# Patient Record
Sex: FEMALE | Race: BLACK OR AFRICAN AMERICAN | NOT HISPANIC OR LATINO | Employment: OTHER | URBAN - METROPOLITAN AREA
[De-identification: names, ages, dates, MRNs, and addresses within clinical notes are randomized per-mention and may not be internally consistent; named-entity substitution may affect disease eponyms.]

---

## 2017-03-16 ENCOUNTER — TELEPHONE (OUTPATIENT)
Dept: TRANSPLANT | Facility: CLINIC | Age: 52
End: 2017-03-16

## 2017-03-16 NOTE — TELEPHONE ENCOUNTER
----- Message from Risa Colbert sent at 3/16/2017  1:38 PM CDT -----  Contact: sister/Verna  Calling to check if she needs appts please call her @ # [phone].

## 2017-03-17 NOTE — TELEPHONE ENCOUNTER
Alix will fax a letter stating that the patient has completed 6 months of a smoking cessation program.  Once we receive the letter, I will make the transplant episode active and we will get her cleared for eval.  I will contact her once clearance is obtained.    Explained that we will need to see the patient in clinic prior to scheduling work up since it has been several months since seeing her.  Verna verbalized understanding.

## 2017-04-03 ENCOUNTER — TELEPHONE (OUTPATIENT)
Dept: TRANSPLANT | Facility: CLINIC | Age: 52
End: 2017-04-03

## 2017-04-03 NOTE — TELEPHONE ENCOUNTER
----- Message from Jackie Manzo RN sent at 11/7/2016  1:55 PM CST -----  Pt had to have 6 months of documented smoking cessation program attendance before his insurance will approve lung transplant evaluation.

## 2017-04-03 NOTE — TELEPHONE ENCOUNTER
Have left several messages for Alix as I have not received the letter we discussed regarding the patient completing 6 months of a smoking cessation program.  Alix states she needs to contact the patient to touch base prior to sending the letter and that she plans to be able to complete it tomorrow.

## 2017-04-13 ENCOUNTER — TELEPHONE (OUTPATIENT)
Dept: TRANSPLANT | Facility: CLINIC | Age: 52
End: 2017-04-13

## 2017-04-13 NOTE — TELEPHONE ENCOUNTER
Notified patient that I have not received a letter regarding her smoking cessation program completion letter form [employer] even though I have contacted Miri numerous times.  She will attempt to obtain the letter.

## 2017-05-04 ENCOUNTER — TELEPHONE (OUTPATIENT)
Dept: TRANSPLANT | Facility: CLINIC | Age: 52
End: 2017-05-04

## 2017-05-04 DIAGNOSIS — J84.9 ILD (INTERSTITIAL LUNG DISEASE): Primary | Chronic | ICD-10-CM

## 2017-05-04 DIAGNOSIS — I27.20 PULMONARY HYPERTENSION: ICD-10-CM

## 2017-05-04 DIAGNOSIS — Z76.82 LUNG TRANSPLANT CANDIDATE: ICD-10-CM

## 2017-05-04 NOTE — TELEPHONE ENCOUNTER
Called patient to see if she was able to obtain a smoking cessation completion letter.  She said she does have it and that [facility] was supposed to have sent it to me as well.  I explained that I never received it.  It was faxed to me this afternoon.  Dr. Shepherd would like to see the patient in clinic to determine candidacy for work up at this point.  Scheduling card placed on [facility]'s desk.

## 2017-05-17 ENCOUNTER — HOSPITAL ENCOUNTER (OUTPATIENT)
Dept: PULMONOLOGY | Facility: CLINIC | Age: 52
Discharge: HOME | End: 2017-05-17
Payer: MEDICAID

## 2017-05-17 ENCOUNTER — OFFICE VISIT (OUTPATIENT)
Dept: TRANSPLANT | Facility: CLINIC | Age: 52
End: 2017-05-17
Payer: MEDICAID

## 2017-05-17 VITALS
TEMPERATURE: 98 F | WEIGHT: 218 LBS | DIASTOLIC BLOOD PRESSURE: 61 MMHG | BODY MASS INDEX: 35.03 KG/M2 | RESPIRATION RATE: 20 BRPM | HEART RATE: 88 BPM | SYSTOLIC BLOOD PRESSURE: 110 MMHG | HEIGHT: 66 IN | OXYGEN SATURATION: 90 %

## 2017-05-17 VITALS — BODY MASS INDEX: 35.08 KG/M2 | HEIGHT: 66 IN | WEIGHT: 218.25 LBS

## 2017-05-17 DIAGNOSIS — Z76.82 LUNG TRANSPLANT CANDIDATE: ICD-10-CM

## 2017-05-17 DIAGNOSIS — J84.9 ILD (INTERSTITIAL LUNG DISEASE): Primary | ICD-10-CM

## 2017-05-17 DIAGNOSIS — I27.20 PULMONARY HYPERTENSION: ICD-10-CM

## 2017-05-17 DIAGNOSIS — J84.9 ILD (INTERSTITIAL LUNG DISEASE): Chronic | ICD-10-CM

## 2017-05-17 DIAGNOSIS — G47.33 OSA ON CPAP: ICD-10-CM

## 2017-05-17 DIAGNOSIS — E66.9 OBESITY (BMI 30-39.9): ICD-10-CM

## 2017-05-17 DIAGNOSIS — J96.11 CHRONIC RESPIRATORY FAILURE WITH HYPOXIA: ICD-10-CM

## 2017-05-17 LAB
PRE FEV1 FVC: 74
PRE FEV1: 1.08
PRE FVC: 1.45
PREDICTED FEV1 FVC: 81
PREDICTED FEV1: 2.8
PREDICTED FVC: 3.43

## 2017-05-17 PROCEDURE — 94010 BREATHING CAPACITY TEST: CPT | Mod: 26,S$PBB,, | Performed by: INTERNAL MEDICINE

## 2017-05-17 PROCEDURE — 99213 OFFICE O/P EST LOW 20 MIN: CPT | Mod: PBBFAC | Performed by: INTERNAL MEDICINE

## 2017-05-17 PROCEDURE — 94729 DIFFUSING CAPACITY: CPT | Mod: 26,S$PBB,, | Performed by: INTERNAL MEDICINE

## 2017-05-17 PROCEDURE — 99999 PR PBB SHADOW E&M-EST. PATIENT-LVL III: CPT | Mod: PBBFAC,,, | Performed by: INTERNAL MEDICINE

## 2017-05-17 PROCEDURE — 94620 PR PULMONARY STRESS TESTING,SIMPLE: CPT | Mod: 26,S$PBB,, | Performed by: INTERNAL MEDICINE

## 2017-05-17 PROCEDURE — 99214 OFFICE O/P EST MOD 30 MIN: CPT | Mod: 25,S$PBB,, | Performed by: INTERNAL MEDICINE

## 2017-05-17 NOTE — LETTER
May 18, 2017        Jason Hayes  [address] [place] [address]  Phone: [phone]  Fax: [phone]             [facility]  [address]  [facility] [address]  Phone: [phone]   Patient: Bronwyn Mills   MR Number: [id number]   YOB: 1965   Date of Visit: 5/17/2017       Dear Dr. Jason Hayes    Thank you for referring Bronwyn Mills to me for evaluation. Attached you will find relevant portions of my assessment and plan of care.    If you have questions, please do not hesitate to call me. I look forward to following Bronwyn Mills along with you.    Sincerely,    Walt Shepherd MD    Enclosure    If you would like to receive this communication electronically, please contact [email or web address] or [phone] to request [employer] Link access.    [employer] Link is a tool which provides read-only access to select patient information with whom you have a relationship. Its easy to use and provides real time access to review your patients record including encounter summaries, notes, results, and demographic information.    If you feel you have received this communication in error or would no longer like to receive these types of communications, please e-mail [email or web address]

## 2017-05-17 NOTE — PROGRESS NOTES
"LUNG TRANSPLANT PRE FOLLOW-UP    Referring Physician: Jason Hayes    Reason for Visit:  Pre-lung transplant follow-up.         Date of Initial Evaluation: September 22, 2016 (admitted due to ILD exacerbation after completing PFTs)                                                                                           History of Present Illness: Bronwyn Mills is a 51 y.o. female who is on 4L of oxygen. She is on CPAP for ANGELA. Her New York Heart Association Class is III and a Karnofsky score of 70. Cares for self; unable to carry on normal activity or active work. She is not diabetic. She has a history of interstitial lung disease of unclear etiology previously believed to be possibly related to either connective tissue disease or sarcoidosis. Autoimmune workup negative. Aldolase is mildly elevated. CRP is elevated, however CPK is negative. Inflammatory myopathy not yet fully excluded.     Since last seen in this clinic she was admitted to the hospital twice with pneumonia. Two or three months ago, she also developed bacteremia due to a soft tissue infection of her arm, and completed a course of home IV antibiotics. She states that she is feeling better, but still has dyspnea and weakness. She is short of breath performing any ADLs. She denies cough. She admits to gaining weight because she has been eating out with her grandchildren. She has been unable to participate in pulmonary rehabilitation because her medicare will not pay for it. She did complete her 6 months of smoking cessation classes.     /61 (BP Location: Left arm, Patient Position: Sitting, BP Method: Automatic)  Pulse 88  Temp 98.4 °F (36.9 °C) (Oral)   Resp 20  Ht 5' 6" (1.676 m)  Wt 98.9 kg (218 lb)  SpO2 (!) 90% Comment: 4L NC  BMI 35.19 kg/m2  Review of Systems   Constitutional: Positive for malaise/fatigue. Negative for chills, fever and weight loss (gaining weight).   HENT: Negative for congestion and sore throat.  " "  Eyes: Positive for blurred vision (needs a new pair of glasses). Negative for double vision, photophobia, pain, discharge and redness.   Respiratory: Positive for shortness of breath. Negative for cough, hemoptysis, sputum production and wheezing.    Cardiovascular: Negative for chest pain, palpitations and leg swelling.   Gastrointestinal: Negative for abdominal pain, blood in stool, constipation, diarrhea, heartburn, nausea and vomiting.   Genitourinary: Negative for dysuria, frequency, hematuria and urgency.   Musculoskeletal: Negative for back pain, joint pain, myalgias (typically has leg cramps but denies today) and neck pain.   Skin: Negative for rash.   Neurological: Positive for weakness. Negative for dizziness, sensory change, focal weakness, seizures, loss of consciousness and headaches.   Psychiatric/Behavioral: Negative for depression and suicidal ideas. The patient is not nervous/anxious and does not have insomnia.      Objective:   /61 (BP Location: Left arm, Patient Position: Sitting, BP Method: Automatic)  Pulse 88  Temp 98.4 °F (36.9 °C) (Oral)   Resp 20  Ht 5' 6" (1.676 m)  Wt 98.9 kg (218 lb)  SpO2 (!) 90% Comment: 4L NC  BMI 35.19 kg/m2    Physical Exam   Constitutional: She is oriented to person, place, and time and well-developed, well-nourished, and in no distress.   Obese woman, sitting in wheelchair.   HENT:   Head: Normocephalic and atraumatic.   Mouth/Throat: Oropharynx is clear and moist.   Eyes: Conjunctivae and EOM are normal. Right eye exhibits no discharge. Left eye exhibits no discharge. No scleral icterus.   Neck: Normal range of motion. Neck supple.   Cardiovascular: Normal rate, regular rhythm, normal heart sounds and intact distal pulses.    No murmur heard.  Pulmonary/Chest: Effort normal and breath sounds normal. No stridor. No respiratory distress. She has no wheezes. She has no rales.   Abdominal: Soft. Bowel sounds are normal. She exhibits no distension. There " is no tenderness.   Musculoskeletal: Normal range of motion. She exhibits no edema, tenderness or deformity.   Neurological: She is alert and oriented to person, place, and time. No cranial nerve deficit.   Skin: Skin is warm and dry. No rash noted. She is not diaphoretic. No erythema.   Psychiatric: Mood, memory, affect and judgment normal.     Labs:  Lab Visit on 05/17/2017   Component Date Value    Alcohol, Urine 05/17/2017 <10     Benzodiazepines 05/17/2017 Negative     Methadone metabolites 05/17/2017 Negative     Cocaine (Metab.) 05/17/2017 Negative     Opiate Scrn, Ur 05/17/2017 Negative     Barbiturate Screen, Ur 05/17/2017 Negative     Amphetamine Screen, Ur 05/17/2017 Negative     THC 05/17/2017 Negative     Phencyclidine 05/17/2017 Negative     Creatinine, Random Ur 05/17/2017 241.0     Toxicology Information 05/17/2017 SEE COMMENT        Pulmonary Function Tests 5/17/2017 11/2/2016 9/22/2016   FVC 1.45 1.06 1.08   FEV1 1.08 0.8 0.81   DLCO (ml/mmHg sec) 6.7 - -   FVC% 42 31 31   FEV1% 39 29 29   FEF 25-75 0.81 0.63 0.59   FEF 25-75% 27 21 20   DLCO% 33 - -     Other:     6MWT (5/17/17):  Distance 244 m (800 ft) no stops  Pre --> Post --> Recovery  SpO2 96% --> 73% --> 94% on RA  HR 90 --> 107 --> 98  /67 --> 139/72  Alesha 0 --> 5-6      Assessment:-  1. ILD (interstitial lung disease)    2. Chronic respiratory failure with hypoxia    3. Obesity (BMI 30-39.9)    4. ANGELA on CPAP    5. Lung transplant candidate      Plan:     1. FVC improved 36% over last 6 months. Etiology of her underlying lung disease remains unclear. ZA and RF negative, SSA & SSB are also negative. Aldolase was elevated at 8.4. CPK was within normal limits (44). CRP was elevated at 13.2. TSH was within normal limits. Definitive diagnosis may ultimately require muscle biopsy and Rheumatology referral.     2. States that her Medicare will not pay for pulmonary rehabilitation. Continue supplemental oxygen.     3. Counseled  regarding healthy diet and weight loss. She will need to lose ~30 lbs to reach a goal BMI of 34.     4. Encouraged adherence to nocturnal CPAP.     5. Ms. Mills meets disease some specific criteria for lung transplant (hospitalizations, desaturation to 73% on 6MWT with distance < 250 m), however she will need to lose ~30 lbs for a goal BMI of 34. She is motivated to do so.    RTC 3 mos    [facility]    Attending Note:    I have seen and evaluated the patient with the fellow. Their note reflects the content of our discussion and my plan of care.      Walt Shepherd MD  Pulmonary/Critical Care Medicine

## 2017-05-18 NOTE — PROCEDURES
Bronwyn Mills is a 51 y.o.  female patient, who presents for a 6 minute walk test ordered by Ifeanyi Shepherd MD.  The diagnosis is Pre Lung Transplant Evaluation; Interstitial Lung Disease.  The patient's BMI is 35.3 kg/m2.  Predicted distance (lower limit of normal) is 372.06 meters.      Test Results:    The test was completed without stopping.  The total time walked was 360 seconds.  During walking, the patient reported:  Dyspnea. The patient used supplemental oxygen during testing.     05/17/2017---------Distance: 243.84 meters (800 feet)     O2 Sat % Supplemental Oxygen Heart Rate Blood Pressure Alesha Scale   Pre-exercise  (Resting) 96 % 4 L/M 90 bpm 116/67 mmHg 0   During Exercise 73 % 4 L/M 107 bpm 139/72 mmHg 5-6   Post-exercise  (Recovery) 94 % 4 L/M  98 bpm       Recovery Time: 112 seconds    Performing nurse/tech: NEDA Nava      PREVIOUS STUDY:   11/02/2016---------Distance: 304.8 meters (1000 feet)       O2 Sat % Supplemental Oxygen Heart Rate Blood Pressure Alesha Scale   Pre-exercise  (Resting) 76 % 2 L/M 98 bpm 118/71 mmHg 3   During Exercise 52 % 2 L/M 118 bpm 138/80 mmHg 9   Post-exercise  (Recovery) 73 % 2 L/M  98 bpm   mmHg         CLINICAL INTERPRETATION:  Six minute walk distance is 243.84 meters (800 feet) with heavy dyspnea.  During exercise, there was significant desaturation while breathing supplemental oxygen.  Both blood pressure and heart rate remained stable with walking.  The patient did not report non-pulmonary symptoms during exercise.  Significant exercise impairment is likely due to desaturation and subjective symptoms.  Since the previous study in November 2016, exercise capacity is significantly worse.  Based upon age and body mass index, exercise capacity is less than predicted.

## 2017-06-12 DIAGNOSIS — J84.9 ILD (INTERSTITIAL LUNG DISEASE): Primary | Chronic | ICD-10-CM

## 2017-06-12 DIAGNOSIS — Z76.82 LUNG TRANSPLANT CANDIDATE: ICD-10-CM

## 2017-07-21 DIAGNOSIS — Z76.82 LUNG TRANSPLANT CANDIDATE: ICD-10-CM

## 2017-07-21 DIAGNOSIS — J84.9 ILD (INTERSTITIAL LUNG DISEASE): Primary | Chronic | ICD-10-CM

## 2017-07-21 DIAGNOSIS — J84.10 PULMONARY FIBROSIS: ICD-10-CM

## 2017-08-15 ENCOUNTER — TELEPHONE (OUTPATIENT)
Dept: TRANSPLANT | Facility: CLINIC | Age: 52
End: 2017-08-15

## 2017-08-15 NOTE — TELEPHONE ENCOUNTER
Notified patient that I would have to reschedule her appointment due to [place] medicaid not covering the spirometry with tomorrow's appointment.  Forwarded to Trish Espinosa.  Once cleared, will reschedule.

## 2017-08-16 ENCOUNTER — TELEPHONE (OUTPATIENT)
Dept: TRANSPLANT | Facility: CLINIC | Age: 52
End: 2017-08-16

## 2017-08-16 NOTE — TELEPHONE ENCOUNTER
Bronwyn's sister, Verna, called to see if we were able to find out anything regarding the patient's medicaid.  I explained that Trish Espinosa, , would be calling her.  I explained that since the patient's address is listed as [place], she no longer qualifies for [place] Medicaid.  Verna states that since the patient has been sick, she has moved in with her in [place].  I told her that we can't schedule her for any appointments until we get the discrepancy in Medicaid coverage fixed and that Trish would be in touch with specifics.  She verbalized understanding.

## 2017-08-30 ENCOUNTER — TELEPHONE (OUTPATIENT)
Dept: TRANSPLANT | Facility: CLINIC | Age: 52
End: 2017-08-30

## 2017-08-30 NOTE — TELEPHONE ENCOUNTER
Pt called to let us know that her [place] medicaid policy is active.  Trish Espinosa able to verify medicaid benefits.  Ok to reschedule.  Kevin will reschedule.

## 2017-09-29 ENCOUNTER — HOSPITAL ENCOUNTER (OUTPATIENT)
Dept: PULMONOLOGY | Facility: CLINIC | Age: 52
Discharge: HOME | End: 2017-09-29
Payer: MEDICAID

## 2017-09-29 ENCOUNTER — OFFICE VISIT (OUTPATIENT)
Dept: TRANSPLANT | Facility: CLINIC | Age: 52
End: 2017-09-29
Payer: MEDICAID

## 2017-09-29 VITALS
BODY MASS INDEX: 34.55 KG/M2 | WEIGHT: 215 LBS | OXYGEN SATURATION: 92 % | RESPIRATION RATE: 20 BRPM | HEIGHT: 66 IN | DIASTOLIC BLOOD PRESSURE: 88 MMHG | TEMPERATURE: 98 F | HEART RATE: 104 BPM | SYSTOLIC BLOOD PRESSURE: 142 MMHG

## 2017-09-29 VITALS — HEIGHT: 66 IN | BODY MASS INDEX: 34.65 KG/M2 | WEIGHT: 215.63 LBS

## 2017-09-29 DIAGNOSIS — J84.10 PULMONARY FIBROSIS: ICD-10-CM

## 2017-09-29 DIAGNOSIS — Z76.82 LUNG TRANSPLANT CANDIDATE: ICD-10-CM

## 2017-09-29 DIAGNOSIS — J84.9 ILD (INTERSTITIAL LUNG DISEASE): Primary | ICD-10-CM

## 2017-09-29 DIAGNOSIS — J84.9 ILD (INTERSTITIAL LUNG DISEASE): Chronic | ICD-10-CM

## 2017-09-29 DIAGNOSIS — J96.11 CHRONIC RESPIRATORY FAILURE WITH HYPOXIA: ICD-10-CM

## 2017-09-29 DIAGNOSIS — E66.9 OBESITY (BMI 30-39.9): ICD-10-CM

## 2017-09-29 LAB
PRE FEV1 FVC: 77
PRE FEV1: 1.15
PRE FVC: 1.5
PREDICTED FEV1 FVC: 81
PREDICTED FEV1: 2.78
PREDICTED FVC: 3.41

## 2017-09-29 PROCEDURE — 99999 PR PBB SHADOW E&M-EST. PATIENT-LVL III: CPT | Mod: PBBFAC,TXP,, | Performed by: INTERNAL MEDICINE

## 2017-09-29 PROCEDURE — 94729 DIFFUSING CAPACITY: CPT | Mod: 26,S$PBB,TXP, | Performed by: INTERNAL MEDICINE

## 2017-09-29 PROCEDURE — 99213 OFFICE O/P EST LOW 20 MIN: CPT | Mod: PBBFAC,25,TXP | Performed by: INTERNAL MEDICINE

## 2017-09-29 PROCEDURE — 94729 DIFFUSING CAPACITY: CPT | Mod: PBBFAC,TXP | Performed by: INTERNAL MEDICINE

## 2017-09-29 PROCEDURE — 94010 BREATHING CAPACITY TEST: CPT | Mod: 26,S$PBB,TXP, | Performed by: INTERNAL MEDICINE

## 2017-09-29 PROCEDURE — 94620 PR PULMONARY STRESS TESTING,SIMPLE: CPT | Mod: 26,S$PBB,TXP, | Performed by: INTERNAL MEDICINE

## 2017-09-29 PROCEDURE — 3008F BODY MASS INDEX DOCD: CPT | Mod: NTX,,, | Performed by: INTERNAL MEDICINE

## 2017-09-29 PROCEDURE — 94010 BREATHING CAPACITY TEST: CPT | Mod: PBBFAC,NTX,59 | Performed by: INTERNAL MEDICINE

## 2017-09-29 PROCEDURE — 99213 OFFICE O/P EST LOW 20 MIN: CPT | Mod: 25,S$PBB,NTX, | Performed by: INTERNAL MEDICINE

## 2017-09-29 PROCEDURE — 94620 PR PULMONARY STRESS TESTING,SIMPLE: CPT | Mod: PBBFAC,TXP | Performed by: INTERNAL MEDICINE

## 2017-09-29 NOTE — LETTER
October 1, 2017        Jason Hayes  [address] [place] [address]  Phone: [phone]  Fax: [phone]             [facility]  [address]  [facility] [address]  Phone: [phone]   Patient: Bronwyn Mills   MR Number: [id number]   YOB: 1965   Date of Visit: 9/29/2017       Dear Dr. Jason Hayes    Thank you for referring Bronwyn Mills to me for evaluation. Attached you will find relevant portions of my assessment and plan of care.    If you have questions, please do not hesitate to call me. I look forward to following Bronwyn Mills along with you.    Sincerely,    Walt Shepherd MD    Enclosure    If you would like to receive this communication electronically, please contact [email or web address] or [phone] to request [employer] Link access.    [employer] Link is a tool which provides read-only access to select patient information with whom you have a relationship. Its easy to use and provides real time access to review your patients record including encounter summaries, notes, results, and demographic information.    If you feel you have received this communication in error or would no longer like to receive these types of communications, please e-mail [email or web address]

## 2017-09-30 NOTE — PROCEDURES
Bronwyn Mills is a 52 y.o.  female patient, who presents for a 6 minute walk test ordered by Ifeanyi Shepherd MD.  The diagnosis is Pre Lung Transplant Evaluation; Interstitial Lung Disease.  The patient's BMI is 34.9 kg/m2.  Predicted distance (lower limit of normal) is 368.72 meters.      Test Results:    The test was completed without stopping.  The total time walked was 360 seconds.  During walking, the patient reported:  Chest pain, Dyspnea.  The patient used supplemental oxygen during testing.     09/29/2017---------Distance: 396.24 meters (1300 feet)     O2 Sat % Supplemental Oxygen Heart Rate Blood Pressure Alesha Scale   Pre-exercise  (Resting) 94 % 6 L/M 107 bpm 147/90 mmHg 10   During Exercise 70 % 6 L/M 139 bpm 187/97 mmHg 10   Post-exercise  (Recovery) 98 % 6 L/M  113 bpm       Recovery Time:  186 seconds    Performing nurse/tech:  RAFAELA Robert      PREVIOUS STUDY:   05/17/2017---------Distance: 243.84 meters (800 feet)       O2 Sat % Supplemental Oxygen Heart Rate Blood Pressure Alesha Scale   Pre-exercise  (Resting) 96 % 4 L/M 90 bpm 116/67 mmHg 0   During Exercise 73 % 4 L/M 107 bpm 139/72 mmHg 5-6   Post-exercise  (Recovery) 94 % 4 L/M  98 bpm           CLINICAL INTERPRETATION:  Six minute walk distance is 396.24 meters (1300 feet) with maximal severe dyspnea.  During exercise, there was significant desaturation while breathing supplemental oxygen.  Both blood pressure and heart rate increased significantly with walking.  Hypertension and Tachycardia were present prior to exercise.  The patient reported non-pulmonary symptoms during exercise.  Since the previous study in May 2017, exercise capacity is significantly improved.  Based upon age and body mass index, exercise capacity is normal.

## 2017-10-01 NOTE — PROGRESS NOTES
LUNG TRANSPLANT PRE FOLLOW-UP    Referring Physician: Jason Hayes    Reason for Visit:  Pre-lung transplant follow-up.         Date of Initial Evaluation:                                                                                              History of Present Illness: Bronwyn Mills is a 52 y.o. female who is on 4L of oxygen. She is on CPAP.  Her New York Heart Association Class is III and a Karnofsky score of 60% - Requires occasional assistance but is able to care for needs. She is not diabetic. She presents today for her follow up on lung transplant candidacy. During her last clinic visit he was asked to lose 15 lbs before we would consider working her up for transplant. She has been unable to lose the weight. She has had no hospitalizations since her last clinic visit. Still short of breath and with a dry cough.     Review of Systems   Constitutional: Positive for malaise/fatigue. Negative for chills, fever and weight loss (gaining weight).   HENT: Negative for congestion and sore throat.    Eyes: Positive for blurred vision (needs a new pair of glasses). Negative for double vision, photophobia, pain, discharge and redness.   Respiratory: Positive for shortness of breath. Negative for cough, hemoptysis, sputum production and wheezing.    Cardiovascular: Negative for chest pain, palpitations and leg swelling.   Gastrointestinal: Negative for abdominal pain, blood in stool, constipation, diarrhea, heartburn, nausea and vomiting.   Genitourinary: Negative for dysuria, frequency, hematuria and urgency.   Musculoskeletal: Negative for back pain, joint pain, myalgias (typically has leg cramps but denies today) and neck pain.   Skin: Negative for rash.   Neurological: Positive for weakness. Negative for dizziness, sensory change, focal weakness, seizures, loss of consciousness and headaches.   Psychiatric/Behavioral: Negative for depression and suicidal ideas. The patient is not nervous/anxious and  "does not have insomnia.      Objective:   BP (!) 142/88 (BP Location: Left arm, Patient Position: Sitting, BP Method: Large (Automatic))   Pulse 104   Temp 97.7 °F (36.5 °C) (Oral)   Resp 20   Ht 5' 6" (1.676 m)   Wt 97.5 kg (215 lb)   SpO2 (!) 92% Comment: 4L NC  BMI 34.70 kg/m²     Physical Exam   Constitutional: She is oriented to person, place, and time and well-developed, well-nourished, and in no distress.   Obese woman, sitting in wheelchair.   HENT:   Head: Normocephalic and atraumatic.   Mouth/Throat: Oropharynx is clear and moist.   Eyes: Conjunctivae and EOM are normal. Right eye exhibits no discharge. Left eye exhibits no discharge. No scleral icterus.   Neck: Normal range of motion. Neck supple.   Cardiovascular: Normal rate, regular rhythm, normal heart sounds and intact distal pulses.    No murmur heard.  Pulmonary/Chest: Effort normal. No stridor. No respiratory distress. She has no wheezes. She has rales in the right middle field, the right lower field, the left middle field and the left lower field.   Abdominal: Soft. Bowel sounds are normal. She exhibits no distension. There is no tenderness.   Musculoskeletal: Normal range of motion. She exhibits no edema, tenderness or deformity.   Neurological: She is alert and oriented to person, place, and time. No cranial nerve deficit.   Skin: Skin is warm and dry. No rash noted. She is not diaphoretic. No erythema.   Psychiatric: Mood, memory, affect and judgment normal.     Labs:  No visits with results within 7 Day(s) from this visit.   Latest known visit with results is:   Lab Visit on 05/17/2017   Component Date Value    Nicotine Urine 05/20/2017 <2.0     Cotinine Urine 05/20/2017 <5.0     Nornicotine Urine 05/20/2017 <2.0     Anabasine Urine 05/20/2017 <2.0     Alcohol, Urine 05/17/2017 <10     Benzodiazepines 05/17/2017 Negative     Methadone metabolites 05/17/2017 Negative     Cocaine (Metab.) 05/17/2017 Negative     Opiate Scrn, Ur " 05/17/2017 Negative     Barbiturate Screen, Ur 05/17/2017 Negative     Amphetamine Screen, Ur 05/17/2017 Negative     THC 05/17/2017 Negative     Phencyclidine 05/17/2017 Negative     Creatinine, Random Ur 05/17/2017 241.0     Toxicology Information 05/17/2017 SEE COMMENT        Pulmonary Function Tests 9/29/2017 5/17/2017 11/2/2016 9/22/2016   FVC 1.5 1.45 1.06 1.08   FEV1 1.15 1.08 0.8 0.81   DLCO (ml/mmHg sec) 7.5 6.7 - -   FVC% 44 42 31 31   FEV1% 41 39 29 29   FEF 25-75 0.94 0.81 0.63 0.59   FEF 25-75% 32 27 21 20   DLCO% 36 33 - -     6MWT: Able to walk 1300 feet ( 396 m) with lowest SpO2 70% at 6L. Very, very heavy dyspnea reported. Patient did not stop during the test. Patient was able to complete the test.       Assessment:-  1. ILD (interstitial lung disease)    2. Chronic respiratory failure with hypoxia    3. Obesity (BMI 30-39.9)    4. Lung transplant candidate      Plan:   1. Will continue to follow her FVC which has remained stable and she has not had exacerbations of her disease.     2. She will continue oxygen supplementation and CPAP.    3. Referral for Bariatric Medicine produced to help her with weight loss.    4. I explained to her that she will not be worked up for lung transplant until she loses a minimum of 15 lbs.     5. RTC in three months.

## 2017-10-03 ENCOUNTER — TELEPHONE (OUTPATIENT)
Dept: TRANSPLANT | Facility: CLINIC | Age: 52
End: 2017-10-03

## 2017-10-03 DIAGNOSIS — E66.9 OBESITY (BMI 35.0-39.9 WITHOUT COMORBIDITY): Primary | ICD-10-CM

## 2017-10-03 DIAGNOSIS — Z76.82 LUNG TRANSPLANT CANDIDATE: ICD-10-CM

## 2017-10-03 NOTE — TELEPHONE ENCOUNTER
Notified patient that we were unable to schedule her with Bariatric medicine as they are no longer accepting Medicaid.  Explained that we would schedule her with our RD to discuss methods for weight loss.  She verbalized understanding.

## 2017-10-11 ENCOUNTER — NUTRITION (OUTPATIENT)
Dept: TRANSPLANT | Facility: CLINIC | Age: 52
End: 2017-10-11
Payer: MEDICAID

## 2017-10-11 VITALS — WEIGHT: 217.13 LBS | BODY MASS INDEX: 36.18 KG/M2 | HEIGHT: 65 IN

## 2017-10-11 DIAGNOSIS — J84.9 ILD (INTERSTITIAL LUNG DISEASE): ICD-10-CM

## 2017-10-11 DIAGNOSIS — J84.10 PULMONARY FIBROSIS: ICD-10-CM

## 2017-10-11 DIAGNOSIS — E66.9 OBESITY (BMI 35.0-39.9 WITHOUT COMORBIDITY): Primary | ICD-10-CM

## 2017-10-11 DIAGNOSIS — Z01.818 ENCOUNTER FOR PRE-TRANSPLANT EVALUATION FOR LUNG TRANSPLANT: ICD-10-CM

## 2017-10-11 PROCEDURE — 97802 MEDICAL NUTRITION INDIV IN: CPT | Mod: PBBFAC,NTX | Performed by: DIETITIAN, REGISTERED

## 2017-10-11 PROCEDURE — 99211 OFF/OP EST MAY X REQ PHY/QHP: CPT | Mod: PBBFAC | Performed by: DIETITIAN, REGISTERED

## 2017-10-11 PROCEDURE — 99999 PR PBB SHADOW E&M-EST. PATIENT-LVL I: CPT | Mod: PBBFAC,TXP,, | Performed by: DIETITIAN, REGISTERED

## 2017-10-11 NOTE — PROGRESS NOTES
"TRANSPLANT NUTRITIONAL ASSESSMENT    Referring Provider: Walt Shepherd MD    Reason for Visit: Pre-lung transplant work-up    Age: 52 y.o.  Sex: female    Patient Active Problem List   Diagnosis    Lung transplant candidate    Pulmonary fibrosis    Hypoxia    ILD (interstitial lung disease)    ANGELA on CPAP    Obesity (BMI 30-39.9)    Pulmonary hypertension    Respiratory failure with hypoxia and hypercapnia     Past Medical History:   Diagnosis Date    COPD (chronic obstructive pulmonary disease)     Hyperlipidemia     ILD (interstitial lung disease)     Obesity (BMI 30-39.9)     ANGELA on CPAP     Pulmonary hypertension     Seasonal allergies      Lab Results   Component Value Date     (H) 09/23/2016    K 3.8 09/23/2016    ALBUMIN 3.4 (L) 09/23/2016    HGBA1C 5.5 09/22/2016    CALCIUM 9.7 09/23/2016     Other Pertinent Labs: none    Current Outpatient Prescriptions   Medication Sig    albuterol (PROAIR HFA) 90 mcg/actuation inhaler Inhale 2 puffs into the lungs every 6 (six) hours as needed for Shortness of Breath.    albuterol (PROVENTIL) 2.5 mg /3 mL (0.083 %) nebulizer solution Take 2.5 mg by nebulization every 6 (six) hours as needed for Wheezing.    amlodipine (NORVASC) 10 MG tablet Take 10 mg by mouth once daily.    fish oil-omega-3 fatty acids 300-1,000 mg capsule Take 1 g by mouth once daily.    lisinopril (PRINIVIL,ZESTRIL) 40 MG tablet Take 40 mg by mouth once daily.    lorazepam (ATIVAN) 1 MG tablet Take 1 mg by mouth 3 (three) times daily as needed.     omeprazole (PRILOSEC) 40 MG capsule Take 40 mg by mouth every morning.    tiotropium (SPIRIVA) 18 mcg inhalation capsule Inhale 18 mcg into the lungs once daily.    vitamin E 400 UNIT capsule Take 400 Units by mouth once daily.     No current facility-administered medications for this visit.      Allergies: Pcn [penicillins]    Ht Readings from Last 1 Encounters:   10/11/17 5' 5" (1.651 m)     Wt Readings from Last 1 " Encounters:   10/11/17 98.5 kg (217 lb 2.5 oz)      BMI: Body mass index is 36.14 kg/m².    Usual Weight: 215-220 lb  Weight Change/Time: states she was trying to loose weight for a couple of months, did not see results and went back to previous eating & sedentary habits  Current Diet: regular  Appetite/Current Intake: good   Exercise/Physical Activity: functional in ADL's, 'active' with grandchild, taking care of   Nutritional/Herbal Supplements: fish oil  Chewing/Swallowing Problems: none  Symptoms: none    Estimated Kcal Need: 2123-4151 kcal (17-20 kcal/kg)  Estimated Protein Need: 78 gm (0.8 gm/kg)    Nutritional History:   Pt present with sister today for evaluation. Pt reports attempt to loose weight after visit to clinic a few months ago, she states she did not see any significant change and went back to her former eating habits. She felt like her changes weren't working and she did not have a direct plan. Pt reports the following diet recall:  B: nothing  L: fast food, either fried chicken w/ mashed potatoes or other sides OR hamburger, fries, soda or chicken sandwich/fried fish sandwich  D: fast food again or home cooked by self or sister; red beans & rice w/ sausage, corn bread OR baked or fried chicken, potatoes OR chicken & dumplings, likes broccoli w/ cheese or ranch dressing, carrots raw or cooked, salad w/ Ranch dressing  Snacks: chips, fruit, yogurt if available, toast w/ boiled egg, pb&j  Beverages: flavored water (unsure whether this is sweetened or sugar free), soda, iced tea (has tried unsweetened and can drink this)     Nutritional Diagnoses  Problem: food- and nutrition-related knowledge deficit  Etiology: r/t no prior edu on weight loss  Symptoms: aeb diet recall, pt questions    Educational Need? yes  Barriers: none identified  Discussed with: patient and sister  Interventions: Patient taught nutrition information regarding Pre-lung transplant work-up and Weight loss education.    Provided  weight loss tips, weight management cooking tips, 1800 calorie sample day plan.  Discussed goal weight 200 lb by 6 week from today, will call patient to follow about. Then goal weight 190 lb to be maintained.  Encouraged using My Fitness Pal tomasz to log and track food intake. Pt family supportive with showing pt how to use tomasz.  Encouraged activity daily, walking, mobility.  Goals/Recommendations: gradual weight loss and limit intake of concentrated sweets  Actions Taken: instruct/provide written information and provide resources for weight management  Patient and/or family comprehend instructions: yes  Outcome: Verbalizes understanding  Monitoring: Follow up in clinic/phone as needed. RD contact info provided.    Counseling Time: 45 minutes

## 2017-10-13 ENCOUNTER — TELEPHONE (OUTPATIENT)
Dept: TRANSPLANT | Facility: CLINIC | Age: 52
End: 2017-10-13

## 2017-10-13 NOTE — TELEPHONE ENCOUNTER
Pt called stating that since she switched to [place] Medicaid, [place] Medicaid will no longer pay for her oxygen.  She states they will pick it up in a week or 2.  She asked if Dr. Shepherd would write a prescription for oxygen.  Explained that he only prescribes oxygen for patients who are listed for transplant.  Told patient to find a PCP who can see her prior to her O2 getting picked up, and if she can't find one, she should go to the ED or Urgent Care.  Pt verbalized understanding.

## 2017-11-24 DIAGNOSIS — Z76.82 LUNG TRANSPLANT CANDIDATE: ICD-10-CM

## 2017-11-24 DIAGNOSIS — J84.9 ILD (INTERSTITIAL LUNG DISEASE): Primary | Chronic | ICD-10-CM

## 2017-12-27 ENCOUNTER — HOSPITAL ENCOUNTER (OUTPATIENT)
Dept: PULMONOLOGY | Facility: CLINIC | Age: 52
Discharge: HOME | End: 2017-12-27
Payer: MEDICAID

## 2017-12-27 ENCOUNTER — OFFICE VISIT (OUTPATIENT)
Dept: TRANSPLANT | Facility: CLINIC | Age: 52
End: 2017-12-27
Payer: MEDICAID

## 2017-12-27 VITALS
SYSTOLIC BLOOD PRESSURE: 142 MMHG | TEMPERATURE: 96 F | BODY MASS INDEX: 36.99 KG/M2 | HEIGHT: 65 IN | RESPIRATION RATE: 20 BRPM | WEIGHT: 222 LBS | DIASTOLIC BLOOD PRESSURE: 86 MMHG | HEART RATE: 97 BPM | OXYGEN SATURATION: 95 %

## 2017-12-27 VITALS — HEIGHT: 66 IN | WEIGHT: 213 LBS | BODY MASS INDEX: 34.23 KG/M2

## 2017-12-27 DIAGNOSIS — J84.9 ILD (INTERSTITIAL LUNG DISEASE): Chronic | ICD-10-CM

## 2017-12-27 DIAGNOSIS — Z76.82 LUNG TRANSPLANT CANDIDATE: ICD-10-CM

## 2017-12-27 DIAGNOSIS — G47.33 OSA ON CPAP: ICD-10-CM

## 2017-12-27 DIAGNOSIS — J96.11 CHRONIC RESPIRATORY FAILURE WITH HYPOXIA: ICD-10-CM

## 2017-12-27 DIAGNOSIS — E66.9 OBESITY (BMI 35.0-39.9 WITHOUT COMORBIDITY): ICD-10-CM

## 2017-12-27 DIAGNOSIS — J84.9 ILD (INTERSTITIAL LUNG DISEASE): Primary | ICD-10-CM

## 2017-12-27 LAB
PRE FEV1 FVC: 79
PRE FEV1: 1
PRE FVC: 1.27
PREDICTED FEV1 FVC: 81
PREDICTED FEV1: 2.7
PREDICTED FVC: 3.3

## 2017-12-27 PROCEDURE — 94620 PR PULMONARY STRESS TESTING,SIMPLE: CPT | Mod: PBBFAC,TXP | Performed by: INTERNAL MEDICINE

## 2017-12-27 PROCEDURE — 94620 PR PULMONARY STRESS TESTING,SIMPLE: CPT | Mod: 26,S$PBB,TXP, | Performed by: INTERNAL MEDICINE

## 2017-12-27 PROCEDURE — 94729 DIFFUSING CAPACITY: CPT | Mod: 26,S$PBB,TXP, | Performed by: INTERNAL MEDICINE

## 2017-12-27 PROCEDURE — 94010 BREATHING CAPACITY TEST: CPT | Mod: 26,S$PBB,TXP, | Performed by: INTERNAL MEDICINE

## 2017-12-27 PROCEDURE — 94729 DIFFUSING CAPACITY: CPT | Mod: PBBFAC,TXP | Performed by: INTERNAL MEDICINE

## 2017-12-27 PROCEDURE — 99999 PR PBB SHADOW E&M-EST. PATIENT-LVL III: CPT | Mod: PBBFAC,TXP,, | Performed by: INTERNAL MEDICINE

## 2017-12-27 PROCEDURE — 94010 BREATHING CAPACITY TEST: CPT | Mod: PBBFAC,NTX,59 | Performed by: INTERNAL MEDICINE

## 2017-12-27 PROCEDURE — 99213 OFFICE O/P EST LOW 20 MIN: CPT | Mod: PBBFAC,25,TXP | Performed by: INTERNAL MEDICINE

## 2017-12-27 PROCEDURE — 99213 OFFICE O/P EST LOW 20 MIN: CPT | Mod: 25,S$PBB,NTX, | Performed by: INTERNAL MEDICINE

## 2017-12-27 RX ORDER — PRAVASTATIN SODIUM 40 MG/1
40 TABLET ORAL NIGHTLY
COMMUNITY
Start: 2017-11-14

## 2017-12-27 RX ORDER — IPRATROPIUM BROMIDE AND ALBUTEROL SULFATE 2.5; .5 MG/3ML; MG/3ML
3 SOLUTION RESPIRATORY (INHALATION) DAILY
COMMUNITY
Start: 2017-11-14 | End: 2019-02-26 | Stop reason: SDUPTHER

## 2017-12-27 RX ORDER — MELOXICAM 7.5 MG/1
1 TABLET ORAL DAILY
COMMUNITY
Start: 2017-10-23 | End: 2018-04-04 | Stop reason: ALTCHOICE

## 2017-12-27 NOTE — LETTER
December 29, 2017        Jason Hayes  [address] [place] [address]  Phone: [phone]  Fax: [phone]             [facility]  [address]  [facility] [address]  Phone: [phone]   Patient: Bronwyn Mills   MR Number: [id number]   YOB: 1965   Date of Visit: 12/27/2017       Dear Dr. Jason Hayes    Thank you for referring Bronwyn Mills to me for evaluation. Attached you will find relevant portions of my assessment and plan of care.    If you have questions, please do not hesitate to call me. I look forward to following Bronwyn Mills along with you.    Sincerely,    Walt Shepherd MD    Enclosure    If you would like to receive this communication electronically, please contact [email or web address] or [phone] to request [employer] access.    [employer] is a tool which provides read-only access to select patient information with whom you have a relationship. Its easy to use and provides real time access to review your patients record including encounter summaries, notes, results, and demographic information.    If you feel you have received this communication in error or would no longer like to receive these types of communications, please e-mail [email or web address]

## 2017-12-27 NOTE — PROCEDURES
Bronwyn Mills is a 52 y.o.  female patient, who presents for a 6 minute walk test ordered by Cora GEE.  The diagnosis is Pre Lung Transplant Evaluation.  The patient's BMI is 34.5 kg/m2.  Predicted distance (lower limit of normal) is 371.22 meters.      Test Results:    The test was completed with stops.  The patient stopped 1 times for a total of 22 seconds.  The total time walked was 338 seconds.  During walking, the patient reported:  Dyspnea. The patient used no assistive devices and supplemental oxygen during testing.     12/27/2017---------Distance: 320.04 meters (1050 feet)     O2 Sat % Supplemental Oxygen Heart Rate Blood Pressure Alesha Scale   Pre-exercise  (Resting) 95 % 4 L/M 101 bpm 130/78 mmHg 9   During Exercise 64 % 4 L/M 123 bpm 161/90 mmHg 10   Post-exercise  (Recovery) 93 % 4 L/M  105 bpm 143/73 mmHg 9     Recovery Time: 261 seconds    Performing nurse/tech: Oj RED      PREVIOUS STUDY:   The patient had a previous study.  09/29/2017---------Distance: 396.24 meters (1300 feet)       O2 Sat % Supplemental Oxygen Heart Rate Blood Pressure Alesha Scale   Pre-exercise  (Resting) 94 % 6 L/M 107 bpm 147/90 mmHg 10   During Exercise 70 % 6 L/M 139 bpm 187/97 mmHg 10   Post-exercise  (Recovery) 98 % 6 L/M  113 bpm             CLINICAL INTERPRETATION:  Six minute walk distance is 320.04 meters (1050 feet) with maximal severe dyspnea.  During exercise, there was significant desaturation while breathing supplemental oxygen.  Both blood pressure and heart rate remained stable with walking.  The patient did not report non-pulmonary symptoms during exercise.  The patient did complete the study, walking 338 seconds of the 360 second test.  Since the previous study in September 2017, exercise capacity is significantly worse.  Based upon age and body mass index, exercise capacity is less than predicted.

## 2017-12-29 NOTE — PROGRESS NOTES
LUNG TRANSPLANT PRE FOLLOW-UP    Referring Physician: Jason Hayes    Reason for Visit:  Pre-lung transplant follow-up.         Date of Initial Evaluation:                                                                                              History of Present Illness: Bronwyn Mills is a 52 y.o. female who is on 4L of oxygen. She is on CPAP.  Her New York Heart Association Class is III and a Karnofsky score of 60% - Requires occasional assistance but is able to care for needs. She is not diabetic. She presents today for her follow up on lung transplant candidacy. During her last clinic visit he was asked to lose 15 lbs before we would consider working her up for transplant. She has been unable to lose the weight. She has had no hospitalizations since her last clinic visit. Still short of breath and with a dry cough.     Review of Systems   Constitutional: Positive for malaise/fatigue. Negative for chills, fever and weight loss (gaining weight).   HENT: Negative for congestion and sore throat.    Eyes: Negative for blurred vision, double vision, photophobia, pain, discharge and redness.   Respiratory: Positive for shortness of breath. Negative for cough, hemoptysis, sputum production and wheezing.    Cardiovascular: Negative for chest pain, palpitations and leg swelling.   Gastrointestinal: Negative for abdominal pain, blood in stool, constipation, diarrhea, heartburn, nausea and vomiting.   Genitourinary: Negative for dysuria, frequency, hematuria and urgency.   Musculoskeletal: Negative for back pain, joint pain, myalgias (typically has leg cramps but denies today) and neck pain.   Skin: Negative for rash.   Neurological: Positive for weakness. Negative for dizziness, sensory change, focal weakness, seizures, loss of consciousness and headaches.   Psychiatric/Behavioral: Negative for depression and suicidal ideas. The patient is not nervous/anxious and does not have insomnia.      Objective:   BP  "(!) 142/86 (BP Location: Right arm, Patient Position: Sitting, BP Method: Large (Automatic))   Pulse 97   Temp 96.3 °F (35.7 °C) (Oral)   Resp 20   Ht 5' 5" (1.651 m)   Wt 100.7 kg (222 lb)   SpO2 95% Comment: 3L NC (continuous)  BMI 36.94 kg/m²     Physical Exam   Constitutional: She is oriented to person, place, and time and well-developed, well-nourished, and in no distress.   Obese woman, sitting in wheelchair.   HENT:   Head: Normocephalic and atraumatic.   Mouth/Throat: Oropharynx is clear and moist.   Eyes: Conjunctivae and EOM are normal. Right eye exhibits no discharge. Left eye exhibits no discharge. No scleral icterus.   Neck: Normal range of motion. Neck supple.   Cardiovascular: Normal rate, regular rhythm, normal heart sounds and intact distal pulses.    No murmur heard.  Pulmonary/Chest: Effort normal. No stridor. No respiratory distress. She has no wheezes. She has rales in the right middle field, the right lower field, the left middle field and the left lower field.   Abdominal: Soft. Bowel sounds are normal. She exhibits no distension. There is no tenderness.   Musculoskeletal: Normal range of motion. She exhibits no edema, tenderness or deformity.   Neurological: She is alert and oriented to person, place, and time. No cranial nerve deficit.   Skin: Skin is warm and dry. No rash noted. She is not diaphoretic. No erythema.   Psychiatric: Mood, memory, affect and judgment normal.     Labs:  No visits with results within 7 Day(s) from this visit.   Latest known visit with results is:   Lab Visit on 05/17/2017   Component Date Value    Nicotine Urine 05/17/2017 <2.0     Cotinine Urine 05/17/2017 <5.0     Nornicotine Urine 05/17/2017 <2.0     Anabasine Urine 05/17/2017 <2.0     Alcohol, Urine 05/17/2017 <10     Benzodiazepines 05/17/2017 Negative     Methadone metabolites 05/17/2017 Negative     Cocaine (Metab.) 05/17/2017 Negative     Opiate Scrn, Ur 05/17/2017 Negative     " Barbiturate Screen, Ur 05/17/2017 Negative     Amphetamine Screen, Ur 05/17/2017 Negative     THC 05/17/2017 Negative     Phencyclidine 05/17/2017 Negative     Creatinine, Random Ur 05/17/2017 241.0     Toxicology Information 05/17/2017 SEE COMMENT        Pulmonary Function Tests 12/27/2017 9/29/2017 5/17/2017 11/2/2016 9/22/2016   FVC 1.27 1.5 1.45 1.06 1.08   FEV1 1 1.15 1.08 0.8 0.81   DLCO (ml/mmHg sec) 6.5 7.5 6.7 - -   FVC% 39 44 42 31 31   FEV1% 37 41 39 29 29   FEF 25-75 0.87 0.94 0.81 0.63 0.59   FEF 25-75% 31 32 27 21 20   DLCO% 32 36 33 - -     6MWT: Able to walk 1050 feet (320 m) with lowest SpO2 64% at 4L. Very, very heavy dyspnea reported. Patient stopped once during the test. Patient was able to complete the test.       Assessment:-  1. ILD (interstitial lung disease)    2. Chronic respiratory failure with hypoxia    3. ANGELA on CPAP    4. Obesity (BMI 35.0-39.9 without comorbidity)    5. Lung transplant candidate      Plan:   1. Will continue to follow her FVC which has a slight decrease. Symptomatically unchanged    2. She will continue oxygen supplementation and CPAP.    3. I explained to her that she will not be worked up for lung transplant until she reaches a weight of 190 lbs.    4. RTC in three months, if she has not lost weigh by her next clinic visit we will stop giving her follow up appointments.

## 2018-01-04 DIAGNOSIS — J84.9 ILD (INTERSTITIAL LUNG DISEASE): Primary | Chronic | ICD-10-CM

## 2018-01-04 DIAGNOSIS — I27.20 PULMONARY HYPERTENSION: ICD-10-CM

## 2018-03-12 ENCOUNTER — TELEPHONE (OUTPATIENT)
Dept: TRANSPLANT | Facility: CLINIC | Age: 53
End: 2018-03-12

## 2018-03-23 PROBLEM — R74.8 ELEVATED LIVER ENZYMES: Status: ACTIVE | Noted: 2018-03-23

## 2018-03-23 PROBLEM — R06.03 ACUTE RESPIRATORY DISTRESS: Status: ACTIVE | Noted: 2018-03-23

## 2018-03-28 ENCOUNTER — HOSPITAL ENCOUNTER (OUTPATIENT)
Dept: PULMONOLOGY | Facility: CLINIC | Age: 53
Discharge: HOME | End: 2018-03-28
Payer: MEDICAID

## 2018-03-28 ENCOUNTER — OFFICE VISIT (OUTPATIENT)
Dept: TRANSPLANT | Facility: CLINIC | Age: 53
End: 2018-03-28
Payer: MEDICAID

## 2018-03-28 VITALS — BODY MASS INDEX: 34.72 KG/M2 | WEIGHT: 216 LBS | HEIGHT: 66 IN

## 2018-03-28 VITALS
OXYGEN SATURATION: 94 % | HEART RATE: 96 BPM | SYSTOLIC BLOOD PRESSURE: 150 MMHG | HEIGHT: 65 IN | BODY MASS INDEX: 36.32 KG/M2 | DIASTOLIC BLOOD PRESSURE: 67 MMHG | WEIGHT: 218 LBS | TEMPERATURE: 98 F | RESPIRATION RATE: 20 BRPM

## 2018-03-28 DIAGNOSIS — I27.20 PULMONARY HYPERTENSION: ICD-10-CM

## 2018-03-28 DIAGNOSIS — E66.9 OBESITY (BMI 35.0-39.9 WITHOUT COMORBIDITY): ICD-10-CM

## 2018-03-28 DIAGNOSIS — J84.9 ILD (INTERSTITIAL LUNG DISEASE): Chronic | ICD-10-CM

## 2018-03-28 DIAGNOSIS — J84.9 ILD (INTERSTITIAL LUNG DISEASE): ICD-10-CM

## 2018-03-28 DIAGNOSIS — J96.11 CHRONIC RESPIRATORY FAILURE WITH HYPOXIA: ICD-10-CM

## 2018-03-28 DIAGNOSIS — Z76.82 LUNG TRANSPLANT CANDIDATE: Primary | ICD-10-CM

## 2018-03-28 DIAGNOSIS — G47.33 OSA ON CPAP: ICD-10-CM

## 2018-03-28 LAB
PRE FEV1 FVC: 74
PRE FEV1: 1.39
PRE FVC: 1.89
PREDICTED FEV1 FVC: 81
PREDICTED FEV1: 2.7
PREDICTED FVC: 3.3

## 2018-03-28 PROCEDURE — 99999 PR PBB SHADOW E&M-EST. PATIENT-LVL IV: CPT | Mod: PBBFAC,TXP,, | Performed by: INTERNAL MEDICINE

## 2018-03-28 PROCEDURE — 94618 PULMONARY STRESS TESTING: CPT | Mod: 26,S$PBB,TXP, | Performed by: INTERNAL MEDICINE

## 2018-03-28 PROCEDURE — 94729 DIFFUSING CAPACITY: CPT | Mod: 26,S$PBB,TXP, | Performed by: INTERNAL MEDICINE

## 2018-03-28 PROCEDURE — 94618 PULMONARY STRESS TESTING: CPT | Mod: PBBFAC,NTX | Performed by: INTERNAL MEDICINE

## 2018-03-28 PROCEDURE — 94729 DIFFUSING CAPACITY: CPT | Mod: PBBFAC,TXP | Performed by: INTERNAL MEDICINE

## 2018-03-28 PROCEDURE — 94010 BREATHING CAPACITY TEST: CPT | Mod: PBBFAC,59,NTX | Performed by: INTERNAL MEDICINE

## 2018-03-28 PROCEDURE — 99214 OFFICE O/P EST MOD 30 MIN: CPT | Mod: PBBFAC,TXP,25 | Performed by: INTERNAL MEDICINE

## 2018-03-28 PROCEDURE — 94010 BREATHING CAPACITY TEST: CPT | Mod: 26,59,S$PBB,TXP | Performed by: INTERNAL MEDICINE

## 2018-03-28 PROCEDURE — 99213 OFFICE O/P EST LOW 20 MIN: CPT | Mod: 25,S$PBB,TXP, | Performed by: INTERNAL MEDICINE

## 2018-03-28 RX ORDER — SERTRALINE HYDROCHLORIDE 50 MG/1
1 TABLET, FILM COATED ORAL DAILY
COMMUNITY
Start: 2018-02-28 | End: 2019-11-10

## 2018-03-28 RX ORDER — CYCLOBENZAPRINE HCL 10 MG
1 TABLET ORAL DAILY
COMMUNITY
Start: 2018-01-31 | End: 2019-06-17

## 2018-03-28 NOTE — LETTER
March 28, 2018        Jason Hayes  [address] [place] [address]  Phone: [phone]  Fax: [phone]             [facility]  [address]  [facility] [address]  Phone: [phone]   Patient: Bronwyn Mills   MR Number: [id number]   YOB: 1965   Date of Visit: 3/28/2018       Dear Dr. Jason Hayes    Thank you for referring Bronwyn Mills to me for evaluation. Attached you will find relevant portions of my assessment and plan of care.    If you have questions, please do not hesitate to call me. I look forward to following Brownyn Mills along with you.    Sincerely,    Walt Shepherd MD    Enclosure    If you would like to receive this communication electronically, please contact [email or web address] or [phone] to request [employer] Link access.    [employer] Link is a tool which provides read-only access to select patient information with whom you have a relationship. Its easy to use and provides real time access to review your patients record including encounter summaries, notes, results, and demographic information.    If you feel you have received this communication in error or would no longer like to receive these types of communications, please e-mail [email or web address]

## 2018-03-28 NOTE — PROGRESS NOTES
LUNG TRANSPLANT PRE FOLLOW-UP    Referring Physician: Jason Hayes     Reason for Visit:  Pre-lung transplant follow-up.          Date of Initial Evaluation: 9/26/206                                                                                             History of Present Illness: Bronwyn Mills is a 52 y.o. female who is on 3L of oxygen. She is on CPAP.  Her New York Heart Association Class is III and a Karnofsky score of 60% - Requires occasional assistance but is able to care for needs. She is not diabetic.  She presents today for her follow up on lung transplant candidacy. During her last clinic visit he was asked to lose 15 lbs before we would consider working her up for transplant. She has been unable to lose the weight.  She was hospitalized 3/23-3/27, for acute on chronic respiratory failure with complaints of increased sputum production and dyspnea with known sick contact (grandchild). Discharged with steroid taper yesterday and feels close to her baseline. States she is starting to diet, and thinks she can lose 1 lb per month.      Review of Systems   Constitutional: Positive for malaise/fatigue. Negative for chills, fever and weight loss (gaining weight).   HENT: Negative for congestion and sore throat.    Eyes: Negative for blurred vision, double vision, photophobia, pain, discharge and redness.   Respiratory: Positive for cough and shortness of breath. Negative for hemoptysis, sputum production and wheezing.    Cardiovascular: Negative for chest pain, palpitations and leg swelling.   Gastrointestinal: Negative for abdominal pain, blood in stool, constipation, diarrhea, heartburn, nausea and vomiting.   Genitourinary: Negative for dysuria, frequency, hematuria and urgency.   Musculoskeletal: Negative for back pain, joint pain, myalgias (typically has leg cramps but denies today) and neck pain.   Skin: Negative for rash.   Neurological: Positive for weakness. Negative for dizziness,  "sensory change, focal weakness, seizures, loss of consciousness and headaches.   Psychiatric/Behavioral: Negative for depression and suicidal ideas. The patient is not nervous/anxious and does not have insomnia.         BP (!) 150/67   Pulse 96   Temp 97.7 °F (36.5 °C) (Oral)   Resp 20   Ht 5' 5" (1.651 m)   Wt 98.9 kg (218 lb)   SpO2 (!) 94% Comment: 3 liters  BMI 36.28 kg/m²     Physical Exam   Constitutional: She is oriented to person, place, and time and well-developed, well-nourished, and in no distress.   HENT:   Head: Normocephalic and atraumatic.   Mouth/Throat: Oropharynx is clear and moist.   Eyes: Conjunctivae and EOM are normal. Right eye exhibits no discharge. Left eye exhibits no discharge. No scleral icterus.   Neck: Normal range of motion. Neck supple.   Cardiovascular: Normal rate, regular rhythm, normal heart sounds and intact distal pulses.    No murmur heard.  Pulmonary/Chest: Effort normal. No stridor. No respiratory distress. She has no wheezes. She has no rales.   Abdominal: Soft. Bowel sounds are normal. She exhibits no distension. There is no tenderness.   Musculoskeletal: Normal range of motion. She exhibits no edema, tenderness or deformity.   Neurological: She is alert and oriented to person, place, and time. No cranial nerve deficit.   Skin: Skin is warm and dry. No rash noted. She is not diaphoretic. No erythema.   Psychiatric: Mood, memory, affect and judgment normal.       Labs:  Admission on 03/23/2018, Discharged on 03/27/2018   Component Date Value    WBC 03/23/2018 12.04     RBC 03/23/2018 4.63     Hemoglobin 03/23/2018 11.3*    Hematocrit 03/23/2018 37.7     MCV 03/23/2018 81*    MCH 03/23/2018 24.4*    MCHC 03/23/2018 30.0*    RDW 03/23/2018 15.1*    Platelets 03/23/2018 255     MPV 03/23/2018 10.2     Gran # (ANC) 03/23/2018 8.0*    Lymph # 03/23/2018 2.4     Mono # 03/23/2018 1.1*    Eos # 03/23/2018 0.5     Baso # 03/23/2018 0.07     nRBC 03/23/2018 0  "    Gran% 03/23/2018 66.3     Lymph% 03/23/2018 20.3     Mono% 03/23/2018 9.1     Eosinophil% 03/23/2018 3.7     Basophil% 03/23/2018 0.6     Aniso 03/23/2018 Slight     Poly 03/23/2018 Occasional     Differential Method 03/23/2018 Automated     Sodium 03/23/2018 142     Potassium 03/23/2018 4.1     Chloride 03/23/2018 102     CO2 03/23/2018 30     Glucose 03/23/2018 102     BUN, Bld 03/23/2018 18     Creatinine 03/23/2018 1.03     Calcium 03/23/2018 9.6     Total Protein 03/23/2018 8.9*    Albumin 03/23/2018 4.4     Total Bilirubin 03/23/2018 0.7     Alkaline Phosphatase 03/23/2018 191*    AST 03/23/2018 71*    ALT 03/23/2018 69*    Anion Gap 03/23/2018 10     eGFR if  03/23/2018 >60     eGFR if non  Amer* 03/23/2018 >60     Lactate (Lactic Acid) 03/23/2018 0.6     Blood Culture, Routine 03/23/2018 No Growth to date     Blood Culture, Routine 03/23/2018 No Growth to date     Blood Culture, Routine 03/23/2018 No Growth to date     Blood Culture, Routine 03/23/2018 No Growth to date     Blood Culture, Routine 03/23/2018 No Growth to date     Blood Culture, Routine 03/23/2018 No Growth to date     Blood Culture, Routine 03/23/2018 No Growth to date     Blood Culture, Routine 03/23/2018 No Growth to date     Blood Culture, Routine 03/23/2018 No Growth to date     Blood Culture, Routine 03/23/2018 No Growth to date     Troponin I 03/23/2018 <0.012     D-Dimer 03/23/2018 1.29*    Influenza A Ag, EIA 03/23/2018 Negative     Influenza B Ag, EIA 03/23/2018 Negative     Flu A & B Source 03/23/2018 nasal swab     Troponin I 03/23/2018 <0.012     Sodium 03/24/2018 140     Potassium 03/24/2018 3.9     Chloride 03/24/2018 101     CO2 03/24/2018 26     Glucose 03/24/2018 169*    BUN, Bld 03/24/2018 17     Creatinine 03/24/2018 1.00     Calcium 03/24/2018 9.4     Total Protein 03/24/2018 8.3     Albumin 03/24/2018 4.1     Total Bilirubin 03/24/2018 0.5      Alkaline Phosphatase 03/24/2018 181*    AST 03/24/2018 46*    ALT 03/24/2018 61*    Anion Gap 03/24/2018 13     eGFR if  03/24/2018 >60     eGFR if non African Amer* 03/24/2018 >60     WBC 03/24/2018 9.61     RBC 03/24/2018 4.48     Hemoglobin 03/24/2018 10.8*    Hematocrit 03/24/2018 36.3*    MCV 03/24/2018 81*    MCH 03/24/2018 24.1*    MCHC 03/24/2018 29.8*    RDW 03/24/2018 15.0*    Platelets 03/24/2018 269     MPV 03/24/2018 10.7     Gran # (ANC) 03/24/2018 8.6*    Lymph # 03/24/2018 0.9*    Mono # 03/24/2018 0.1*    Eos # 03/24/2018 0.0     Baso # 03/24/2018 0.03     nRBC 03/24/2018 0     Gran% 03/24/2018 89.0*    Lymph% 03/24/2018 9.5*    Mono% 03/24/2018 1.0*    Eosinophil% 03/24/2018 0.2     Basophil% 03/24/2018 0.3     Aniso 03/24/2018 Slight     Poly 03/24/2018 Occasional     Hypo 03/24/2018 Occasional     Differential Method 03/24/2018 Automated     Troponin I 03/24/2018 <0.012     Cholesterol 03/24/2018 156     Triglycerides 03/24/2018 46     HDL 03/24/2018 58     LDL Cholesterol 03/24/2018 88.8     HDL/Chol Ratio 03/24/2018 37.2     Total Cholesterol/HDL Ra* 03/24/2018 2.7     Non-HDL Cholesterol 03/24/2018 98     Hemoglobin A1C 03/24/2018 5.4     Estimated Avg Glucose 03/24/2018 108     Magnesium 03/24/2018 2.3        Pulmonary Function Tests 3/28/2018 12/27/2017 9/29/2017 5/17/2017 11/2/2016 9/22/2016   FVC 1.89 1.27 1.5 1.45 1.06 1.08   FEV1 1.39 1 1.15 1.08 0.8 0.81   DLCO (ml/mmHg sec) 9.1 6.5 7.5 6.7 - -   FVC% 58 39 44 42 31 31   FEV1% 52 37 41 39 29 29   FEF 25-75 0.98 0.87 0.94 0.81 0.63 0.59   FEF 25-75% 34 31 32 27 21 20   DLCO% 45 32 36 33 - -     Other: walked 1000ft without stopping, DAVID 4, desaturated to 88% on 4L NC.    Assessment:-  1. Lung transplant candidate    2. ILD (interstitial lung disease)    3. Chronic respiratory failure with hypoxia    4. ANGELA on CPAP    5. Obesity (BMI 35.0-39.9 without comorbidity)      Plan:   1.  FVC has increased slightly and her walk distance is improved. With exception of recent exacerbation, symptoms have been stable over past few months.    2. She is overweight and has been given a goal weight of 190lbs. This has been discussed at multiple appointments over the past year. She has been unable to lose weight, today is 218lbs. Again discussed diet modification.     3. Continue oxygen supplementation and CPAP.    4. Further appointments for transplant evaluation are deferred until weight goal is reached.    Moni Hanks MD  [facility] Fellow    Attending Note:    I have seen and evaluated the patient with the fellow. Their note reflects the content of our discussion and my plan of care.      Walt Shepherd MD  Pulmonary/Critical Care Medicine

## 2018-03-29 NOTE — PROCEDURES
Bronwyn Mills is a 52 y.o.  female patient, who presents for a 6 minute walk test ordered by Ifeanyi Shepherd MD.  The diagnosis is Pre Lung Transplant Evaluation; Interstitial Lung Disease.  The patient's BMI is 34.9 kg/m2.  Predicted distance (lower limit of normal) is 368.72 meters.      Test Results:    The test was completed without stopping.  The total time walked was 360 seconds.  During walking, the patient reported:  Dyspnea.  The patient used supplemental oxygen during testing.     03/28/2018---------Distance: 304.8 meters (1000 feet)     O2 Sat % Supplemental Oxygen Heart Rate Blood Pressure Alesha Scale   Pre-exercise  (Resting) 95 % 4 L/M 106 bpm 128/73 mmHg 2   During Exercise 86 % 4 L/M 132 bpm 130/82 mmHg 4   Post-exercise  (Recovery) 99 % 4 L/M  113 bpm       Recovery Time:  120 seconds    Performing nurse/tech:  SHELDON Bourgeois RRT      PREVIOUS STUDY:   12/27/2017---------Distance: 320.04 meters (1050 feet)       O2 Sat % Supplemental Oxygen Heart Rate Blood Pressure Alesha Scale   Pre-exercise  (Resting) 95 % 4 L/M 101 bpm 130/78 mmHg 9   During Exercise 64 % 4 L/M 123 bpm 161/90 mmHg 10   Post-exercise  (Recovery) 93 % 4 L/M  105 bpm 143/73 mmHg 9       CLINICAL INTERPRETATION:  Six minute walk distance is 304.8 meters (1000 feet) with somewhat heavy dyspnea.  During exercise, there was significant desaturation while breathing supplemental oxygen.  Blood pressure remained stable and Heart rate increased significantly with walking.  Tachycardia was present prior to exercise.  The patient did not report non-pulmonary symptoms during exercise.  Since the previous study in December 2017, exercise capacity is unchanged.  Based upon age and body mass index, exercise capacity is less than predicted.

## 2019-05-08 PROBLEM — J96.01 ACUTE HYPOXEMIC RESPIRATORY FAILURE: Status: ACTIVE | Noted: 2019-05-08

## 2019-05-08 PROBLEM — I10 HYPERTENSION: Status: ACTIVE | Noted: 2019-05-08

## 2019-05-09 PROBLEM — R73.9 HYPERGLYCEMIA: Status: ACTIVE | Noted: 2019-05-09

## 2019-05-13 PROBLEM — J96.21 ACUTE ON CHRONIC RESPIRATORY FAILURE WITH HYPOXIA: Status: ACTIVE | Noted: 2019-05-08

## 2019-05-21 PROBLEM — K52.9 ACUTE GASTROENTERITIS: Status: ACTIVE | Noted: 2019-05-21

## 2019-05-21 PROBLEM — E86.1 HYPOTENSION DUE TO HYPOVOLEMIA: Status: ACTIVE | Noted: 2019-05-21

## 2019-06-17 PROBLEM — J84.9 INTERSTITIAL LUNG DISEASE: Status: ACTIVE | Noted: 2019-06-17

## 2019-06-18 PROBLEM — E87.5 HYPERKALEMIA: Status: ACTIVE | Noted: 2019-06-18

## 2019-06-19 PROBLEM — E83.52 HYPERCALCEMIA: Status: ACTIVE | Noted: 2019-06-19

## 2019-07-11 ENCOUNTER — TELEPHONE (OUTPATIENT)
Dept: TRANSPLANT | Facility: CLINIC | Age: 54
End: 2019-07-11

## 2019-07-11 NOTE — TELEPHONE ENCOUNTER
Contacted patient to find out what her current weight is.  She states it is 215.  Explained that when she was seen in March 2018, her weight was 218 and she was given a goal weight of 190.  Encouraged patient to continue weight loss efforts and to to contact me when she reaches her goal weight of 190 lb.  Pt verbalized understanding.    ----- Message from Nikia Maldonado sent at 7/11/2019  7:47 AM CDT -----  Contact: self  Pt would like to start the process of having a lung transplant.  She was told to lose weight and then she could continue the process.  Pt has been losing weight and she is ready to start.    Please call pt at [phone]

## 2019-09-04 PROBLEM — I10 HYPERTENSION: Chronic | Status: ACTIVE | Noted: 2019-05-08

## 2019-11-10 PROBLEM — K52.9 GASTROENTERITIS: Status: ACTIVE | Noted: 2019-11-10

## 2019-11-10 PROBLEM — I95.1 ORTHOSTASIS: Status: ACTIVE | Noted: 2019-11-10

## 2019-11-10 PROBLEM — D64.9 ANEMIA: Status: ACTIVE | Noted: 2019-11-10

## 2019-11-22 PROBLEM — J96.00 ACUTE RESPIRATORY FAILURE: Status: ACTIVE | Noted: 2019-11-22

## 2019-11-24 PROBLEM — K59.00 CONSTIPATION: Status: ACTIVE | Noted: 2019-11-24

## 2020-03-08 NOTE — TELEPHONE ENCOUNTER
Notified patient that he clinic appointments are scheduled for 3/28.  Gave her the times.  She verbalized understanding.    ----- Message from Catrachito Cortez sent at 3/12/2018 10:27 AM CDT -----  Contact: patient   Patient returning a call about scheduling an appointment.        Please call [phone]        Thanks     
Adequate: hears normal conversation without difficulty

## 2020-09-08 PROBLEM — J84.112 IPF (IDIOPATHIC PULMONARY FIBROSIS): Status: ACTIVE | Noted: 2020-09-08

## 2020-09-09 PROBLEM — R06.03 RESPIRATORY DISTRESS: Status: ACTIVE | Noted: 2020-09-09

## 2020-09-09 PROBLEM — R06.03 RESPIRATORY DISTRESS: Status: RESOLVED | Noted: 2020-09-09 | Resolved: 2020-09-09

## 2020-09-11 PROBLEM — R51.9 HEADACHE: Status: ACTIVE | Noted: 2020-09-11

## 2020-09-13 PROBLEM — K21.9 ACID REFLUX DISEASE: Status: ACTIVE | Noted: 2020-09-13

## 2021-03-25 PROBLEM — J84.112 ACUTE EXACERBATION OF IDIOPATHIC PULMONARY FIBROSIS: Status: ACTIVE | Noted: 2021-01-01

## 2021-03-26 PROBLEM — J84.112 IPF (IDIOPATHIC PULMONARY FIBROSIS): Status: RESOLVED | Noted: 2020-09-08 | Resolved: 2021-01-01

## 2021-03-26 PROBLEM — R06.02 SOB (SHORTNESS OF BREATH): Status: ACTIVE | Noted: 2021-01-01

## 2021-04-08 PROBLEM — E78.5 HYPERLIPIDEMIA: Status: ACTIVE | Noted: 2021-01-01

## 2021-06-19 PROBLEM — E66.01 CLASS 3 SEVERE OBESITY WITH BODY MASS INDEX (BMI) OF 40.0 TO 44.9 IN ADULT: Status: ACTIVE | Noted: 2021-01-01

## 2021-10-13 NOTE — TELEPHONE ENCOUNTER
Verna states that patient is doing well overall, besides a couple of recent hospitalizations.  She states that the patient finished her smoking cessation program last Thursday and would like to know what they need to do schedule an appt with us.  Explained that insurance was requiring 6 months in a smoking cessation program in order to approve the eval testing.  LM suzette Chery with [facility] in [place] [phone].   ED MD

## 2022-01-03 PROBLEM — G62.9 NEUROPATHY: Chronic | Status: ACTIVE | Noted: 2022-01-01

## 2022-01-16 PROBLEM — R07.9 CHEST PAIN: Status: ACTIVE | Noted: 2022-01-01

## 2022-01-16 PROBLEM — I25.119 CHEST PAIN DUE TO CAD: Status: ACTIVE | Noted: 2022-01-01
